# Patient Record
Sex: MALE | Race: WHITE | NOT HISPANIC OR LATINO | URBAN - METROPOLITAN AREA
[De-identification: names, ages, dates, MRNs, and addresses within clinical notes are randomized per-mention and may not be internally consistent; named-entity substitution may affect disease eponyms.]

---

## 2018-04-18 ENCOUNTER — EMERGENCY (EMERGENCY)
Facility: HOSPITAL | Age: 48
LOS: 1 days | Discharge: HOME | End: 2018-04-18
Attending: EMERGENCY MEDICINE
Payer: COMMERCIAL

## 2018-04-18 VITALS
HEART RATE: 74 BPM | TEMPERATURE: 99 F | DIASTOLIC BLOOD PRESSURE: 76 MMHG | SYSTOLIC BLOOD PRESSURE: 122 MMHG | RESPIRATION RATE: 18 BRPM | OXYGEN SATURATION: 97 %

## 2018-04-18 PROCEDURE — 99282 EMERGENCY DEPT VISIT SF MDM: CPT | Mod: 25

## 2018-04-18 PROCEDURE — 12001 RPR S/N/AX/GEN/TRNK 2.5CM/<: CPT

## 2018-04-18 NOTE — ED PROVIDER NOTE - SKIN, MLM
Skin normal color for race, warm, dry and intact. No evidence of rash. 2 cm laceration to lateral dorsum left hand no loss of sensation

## 2018-04-18 NOTE — ED PROVIDER NOTE - MUSCULOSKELETAL, MLM
Spine appears normal, range of motion is not limited, no muscle or joint tenderness FROM no loss of sensation

## 2018-04-18 NOTE — ED ADULT TRIAGE NOTE - CHIEF COMPLAINT QUOTE
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

## 2018-04-26 ENCOUNTER — EMERGENCY (EMERGENCY)
Facility: HOSPITAL | Age: 48
LOS: 1 days | Discharge: HOME | End: 2018-04-26
Attending: EMERGENCY MEDICINE
Payer: COMMERCIAL

## 2018-04-26 VITALS
WEIGHT: 199.96 LBS | TEMPERATURE: 98 F | HEIGHT: 68 IN | DIASTOLIC BLOOD PRESSURE: 86 MMHG | SYSTOLIC BLOOD PRESSURE: 128 MMHG | HEART RATE: 87 BPM | RESPIRATION RATE: 18 BRPM | OXYGEN SATURATION: 97 %

## 2018-04-26 PROCEDURE — G0463: CPT

## 2018-04-26 NOTE — ED PROVIDER NOTE - OBJECTIVE STATEMENT
S/P LAC L HAND.   NO PAIN REDNESS PUS OR SWELLING  PLACED JUST OVER ONE WEEK  NO CHANGE IN ROM  MED HX NEG

## 2018-04-26 NOTE — ED PROVIDER NOTE - CARE PLAN
Principal Discharge DX:	Laceration of left hand, foreign body presence unspecified, subsequent encounter

## 2018-08-10 ENCOUNTER — EMERGENCY (EMERGENCY)
Facility: HOSPITAL | Age: 48
LOS: 1 days | Discharge: HOME | End: 2018-08-10
Attending: EMERGENCY MEDICINE
Payer: COMMERCIAL

## 2018-08-10 VITALS
OXYGEN SATURATION: 95 % | HEART RATE: 77 BPM | TEMPERATURE: 99 F | DIASTOLIC BLOOD PRESSURE: 84 MMHG | SYSTOLIC BLOOD PRESSURE: 138 MMHG | RESPIRATION RATE: 18 BRPM

## 2018-08-10 VITALS — HEIGHT: 68 IN | WEIGHT: 179.9 LBS

## 2018-08-10 PROCEDURE — 96374 THER/PROPH/DIAG INJ IV PUSH: CPT

## 2018-08-10 PROCEDURE — 96375 TX/PRO/DX INJ NEW DRUG ADDON: CPT

## 2018-08-10 PROCEDURE — 99284 EMERGENCY DEPT VISIT MOD MDM: CPT | Mod: 25

## 2018-08-10 RX ORDER — DIPHENHYDRAMINE HCL 50 MG
1 CAPSULE ORAL
Qty: 20 | Refills: 0 | OUTPATIENT
Start: 2018-08-10 | End: 2018-08-14

## 2018-08-10 RX ORDER — FAMOTIDINE 10 MG/ML
20 INJECTION INTRAVENOUS ONCE
Qty: 0 | Refills: 0 | Status: COMPLETED | OUTPATIENT
Start: 2018-08-10 | End: 2018-08-10

## 2018-08-10 RX ORDER — FAMOTIDINE 10 MG/ML
1 INJECTION INTRAVENOUS
Qty: 10 | Refills: 0 | OUTPATIENT
Start: 2018-08-10 | End: 2018-08-14

## 2018-08-10 RX ORDER — EPINEPHRINE 0.3 MG/.3ML
0.3 INJECTION INTRAMUSCULAR; SUBCUTANEOUS
Qty: 1 | Refills: 0 | OUTPATIENT
Start: 2018-08-10

## 2018-08-10 RX ADMIN — FAMOTIDINE 20 MILLIGRAM(S): 10 INJECTION INTRAVENOUS at 08:15

## 2018-08-10 RX ADMIN — Medication 125 MILLIGRAM(S): at 08:16

## 2018-08-10 NOTE — ED STATDOCS - OBJECTIVE STATEMENT
47 y/o M with some fruit allergies ate a peach this morning.  He began having tickling in back of throat, difficulty swallowing, swelling around eyes, hives and itch.  He took benadryl 50 mg po PTA.  He denies SOB.

## 2018-08-10 NOTE — ED ADULT NURSE NOTE - OBJECTIVE STATEMENT
Patient found laying in stretcher, awake, alert, and oriented times 3, breathing unlabored.  Patient states ate apple and peach this morning and approx 45 minutes after began to have itchiness to body and swelling to bilateral eyes.  Patient also states itchiness to throat and intermittent difficulty swallowing.  Able to speak without difficulty,  No drooling noted

## 2018-08-10 NOTE — ED STATDOCS - ATTENDING CONTRIBUTION TO CARE
facial swelling and unusual sensation with swallowing after eating  peach this morning.  Reports allergic reaction with fruit consumption in past: through it was related to pesticides.  Denies diff breathing, LH, chest pain or tongue swelling.  Took benadryl PTA. PE:  nontoxic appearing, speaking in full sentences, no uvula edema, clear bs louie.  A/P  allergic reaction:

## 2018-08-10 NOTE — ED STATDOCS - MEDICAL DECISION MAKING DETAILS
In NAD, good airflow, uvula without swelling, VSS.  Will rx pepcid and solu-medrol IV, IVF, bedside pulse ox, reassess.

## 2018-08-10 NOTE — ED STATDOCS - PROGRESS NOTE DETAILS
NP NOTE:  swelling slightly improved, continued sensation in back of throat and difficulty swallowing.  O2 Sat 96%, good air movement, lungs CTA/BL.  Will continue to monitor. NP NOTE:  Symptoms "90%" resolved.  Will d/c home with rx ibuprofen, prednisone, and pepcid, epi-pen, Patient aware that he is to return to ED if he has any SOB or difficulty breathing.

## 2020-10-01 NOTE — ED ADULT NURSE NOTE - PERIPHERAL VASCULAR
----- Message from Wale Steele MD sent at 10/1/2020 12:43 PM CDT -----  Please notify the patient of stable echo results.  I would like to repeat an echocardiogram in 1 month to re-evaluate the effusion.  Follow-up as planned.  
Patient was contacted, results and recommendations as listed below by provider were relayed.  Patient verbalizes understanding and agrees to repeat echo in 1 month. He has no further questions at this time.     
WDL

## 2022-08-02 NOTE — ED PROVIDER NOTE - CPE EDP NEURO NORM
Clinic Care Coordination Contact    Follow Up Progress Note      Assessment: The RN CC nurse care coordinator contacted the patient by phone for a follow-up call.  The patient had an  injection from the pain management Dr. Prescott.  The patient states that he has had some relief with the injection that he received.  He is hoping that it will last for a while.  The patient is scheduled for urodynamics and that is coming up in the next couple of weeks.  This should show him the issue and how they are going to treat it.    Medication review was completed with the patient and marked as reviewed.  Health maintenance was reviewed and questions were answered with the patient.  The assessment for hypertension was completed with the patient today as well as the social determinants of health and they were marked as reviewed.      Care Gaps:    Health Maintenance Due   Topic Date Due     PREVENTIVE CARE VISIT  Never done     ASTHMA ACTION PLAN  Never done     COLORECTAL CANCER SCREENING  Never done       Care Gaps Last addressed on 8/2/22    Goals addressed this encounter:    Goals Addressed                    This Visit's Progress       Medical (pt-stated)   20%      Goal Statement #2: I will complete the Health Maintenance due in the next 1-3 months   Health Maintenance Due   Topic Date Due     PREVENTIVE CARE VISIT  Never done     ASTHMA ACTION PLAN  Never done     ADVANCE CARE PLANNING  Never done     Pneumococcal Vaccine: Pediatrics (0 to 5 Years) and At-Risk Patients (6 to 64 Years) (1 of 2 - PPSV23) Never done     COVID-19 Vaccine (1) Never done     HIV SCREENING  Never done     HEPATITIS C SCREENING  Never done     LIPID  Never done     ZOSTER IMMUNIZATION (1 of 2) Never done     INFLUENZA VACCINE (1) Never done      Date Goal set: 11/2/2021  Barriers: None identified   Strengths: agrees with the plan   Date to Achieve By: 2/2/2023  Patient expressed understanding of goal: Yes  Action steps to achieve this goal:  1. I  will discuss with my provider at a future visit              Pain Management (pt-stated)   70%      Goal Statement #1: I will be able to urinate on my own without difficulty .    I will decrease bilateral buttocks and thigh pain in the next 3-6  months   Date Goal set: 11/2/2021  Barriers: constant pain   Strengths: Motivated   Date to Achieve By: 5/2/2022 Updated 8/2/2022  Patient expressed understanding of goal: Yes  Action steps to achieve this goal:  1. I will keep future Primary Care ( recently switched to the [facility] ),Urology, Pain Clinic appointments and will make a future Neurology appointment   2. I will drink plenty of water to keep my urine clear and yellow  3. I will take Tylenol/Ibuprofen and Gabapentin as directed   4. I will walk a block when able to increase strength   5. I will continue dry needling 3 times a week  6. I will keep future appointments scheduled with [facility] specialist in [place] June 6, 8 and 10th              Intervention/Education provided during outreach: The RN CC reviewed with the patient getting his fluids so that he does not end up dehydrated.     Outreach Frequency: monthly        Plan:   1.  The patient will continue to take in a enough water to not become dehydrated.  2.  The patient will make an appointment for his preventative care visit.  3.  The patient will use the medications he has for pain as prescribed by the providers.    RN CC Nurse Care Coordinator will follow up in 4 weeks.            Sebastian Jimenez MSN, RN, PHN, CCM   Primary Care Clinical RN Care Coordinator  [facility]  8/2/2022   9:04 AM  [email or web address]  Office: [phone]     normal...

## 2024-07-08 NOTE — ED PROVIDER NOTE - CONSTITUTIONAL, MLM
Stable.  Continue Flonase as directed.  Avoid known triggers when possible.  Notify provider if new or worsening symptoms.   normal... Well appearing, well nourished, awake, alert, oriented to person, place, time/situation and in no apparent distress.

## 2024-12-18 NOTE — ED PROVIDER NOTE - AREA
Problem: Adult Inpatient Plan of Care  Goal: Plan of Care Review  Outcome: Adequate for Care Transition  Flowsheets (Taken 12/18/2024 1153)  Plan of Care Reviewed With:   patient   child     Problem: Adult Inpatient Plan of Care  Goal: Patient-Specific Goal (Individualized)  Outcome: Adequate for Care Transition  Flowsheets (Taken 12/18/2024 1153)  Individualized Care Needs: Pain management     Problem: Diabetes Comorbidity  Goal: Blood Glucose Level Within Targeted Range  Intervention: Monitor and Manage Glycemia  Flowsheets (Taken 12/18/2024 1153)  Glycemic Management: blood glucose monitored     Problem: Pain Acute  Goal: Optimal Pain Control and Function  Intervention: Develop Pain Management Plan  Flowsheets (Taken 12/18/2024 1153)  Pain Management Interventions:   around-the-clock dosing utilized   care clustered   cold applied   breathing exercises utilized   pain management plan reviewed with patient/caregiver   pain pump in use   pillow support provided   position adjusted   quiet environment facilitated   relaxation techniques promoted   warm blanket provided     Problem: Knee Arthroplasty  Goal: Absence of Bleeding  Intervention: Monitor and Manage Bleeding  Flowsheets (Taken 12/18/2024 1153)  Bleeding Management: dressing monitored     Problem: Knee Arthroplasty  Goal: Nausea and Vomiting Relief  Intervention: Prevent or Manage Nausea and Vomiting  Flowsheets (Taken 12/18/2024 1153)  Nausea/Vomiting Interventions:   nausea triggers minimized   stimuli minimized     Problem: Fall Injury Risk  Goal: Absence of Fall and Fall-Related Injury  Intervention: Identify and Manage Contributors  Flowsheets (Taken 12/18/2024 1153)  Medication Review/Management:   medications reviewed   high-risk medications identified     Problem: Fall Injury Risk  Goal: Absence of Fall and Fall-Related Injury  Intervention: Promote Injury-Free Environment  Flowsheets (Taken 12/18/2024 1153)  Safety Promotion/Fall Prevention:    assistive device/personal item within reach   Fall Risk reviewed with patient/family   family expresses understanding of fall risk and prevention   high risk medications identified   instructed to call staff for mobility   lighting adjusted   medications reviewed   nonskid shoes/socks when out of bed   patient expresses understanding of fall risk and prevention   side rails raised x 2   Supervised toileting - stay within arms reach     Plan of care reviewed with patient, verbalized understanding. Medications reviewed and given as ordered. Rounding for safety and patient care per policy. Safety precautions maintained. Call light within reach, bed wheels locked, bed in lowest position, side rails up x2, patient instructed to call for assistance, DME at bedside.    lateral/dorsum

## 2025-05-28 NOTE — ED PROCEDURE NOTE - CPROC ED COMPLICATIONS1
Physical Therapy    Visit Type: initial evaluation  SUBJECTIVE  \"The home therapist just makes you throw your arms up. I don't need that.\"   Patient / Family Goal: maximize function and return home    Pain   Patient denies pain.     OBJECTIVE     Cognitive Status   Orientation    - Oriented to: person, place, time and situation  Functional Communication   - Overall Communication Status: within functional limits   - Forms of Communication: verbal  Attention Span    - Attention: intact  Following Direction   - follows all commands and directions consistently  Transition Between Tasks   - transitions without difficulty  Safety Awareness/Insight   - good awareness of safety precautions  Awareness of Deficits   - fully aware of deficits     Range of Motion (ROM)   (degrees unless noted; active unless noted; norms in ( ); negative=lacking to 0, positive=beyond 0)  WFL: LLE, RLE    Strength  (out of 5 unless noted, standard test position unless noted)   WFL: LLE, RLE      Sitting Balance  (LEXA = base of support)  Static      - Trial 1 details: modified independent    Standing Balance  (LEXA = base of support)  Firm Surface: Double Leg      - Static, Eyes Open       - Trial 1 details: modified independent and with double UE support       Transfers  Assistive devices: gait belt, 2-wheeled walker  - Sit to stand: modified independent  - Stand to sit: modified independent    Ambulation / Gait  - Assistive device: gait belt and 2-wheeled walker  - Distance (feet unless otherwise indicated): 250  - Assist Level: modified independent  - Surface: even  - Description: decreased kathy/pace      Interventions     Training provided: body mechanics, gait training, safety training, transfer training, use of assistive device and balance retraining    Skilled input: Verbal instruction/cues, tactile instruction/cues, posture correction and facilitation  Verbal Consent: Writer verbally educated and received verbal consent for hand  placement, positioning of patient, and techniques to be performed today from patient for clothing adjustments for techniques, therapist position for techniques and hand placement and palpation for techniques as described above and how they are pertinent to the patient's plan of care.         Education:   - Present and ready to learn: patient  Education provided during session:  - role of PT, safety and use of assistive device  - Results of above outlined education: Verbalizes understanding and Demonstrates understanding    ASSESSMENT   Interfering components: decreased activity tolerance    Discharge needs based on today's assessment:   - Current level of function: at baseline level of function   - Therapy needs at discharge: does not require ongoing therapy   - Impairments that require further therapy intervention: activity tolerance    AM-PAC  - Generalized Prior Level of Function: IND/MOD I ([facility] 22-24)       Key: MOD A=moderate assistance, IND/MOD I=independent/modified independent  - Generalized Current Level of Function     - Current Mobility Score: 23       AM-PAC Scoring Key= >21 Modified Independent; 20-21 Supervision; 18-19 Minimal assist; 13-17 Moderate assist; 9-12 Max assist; <9 Total assist        Predicted patient presentation: Low (stable) - Patient comorbidities and complexities, as defined above, will have little effect on progress for prescribed plan of care.    PLAN (while hospitalized)  Suggestions for next session as indicated:   PT Frequency: DC PT      PT/OT Mobility Equipment for Discharge: pt owns a rolling walker     Agreement to plan and goals: patient agrees with goals and treatment plan      Documented in the chart in the following areas:   Services. Prior Function. Assessment/Plan.       Patient at End of Session:   Location: in chair  Safety measures: alarm system in place/re-engaged and call light within reach  Handoff to: nurse        Therapy procedure time and total  treatment time can be found documented on the Time Entry flowsheet   no